# Patient Record
Sex: MALE | Race: WHITE | NOT HISPANIC OR LATINO | Employment: OTHER | URBAN - METROPOLITAN AREA
[De-identification: names, ages, dates, MRNs, and addresses within clinical notes are randomized per-mention and may not be internally consistent; named-entity substitution may affect disease eponyms.]

---

## 2019-04-13 ENCOUNTER — HOSPITAL ENCOUNTER (EMERGENCY)
Facility: HOSPITAL | Age: 59
Discharge: HOME | End: 2019-04-13
Attending: FAMILY MEDICINE
Payer: MEDICARE

## 2019-04-13 VITALS
HEART RATE: 97 BPM | WEIGHT: 250 LBS | BODY MASS INDEX: 32.08 KG/M2 | TEMPERATURE: 98 F | OXYGEN SATURATION: 91 % | HEIGHT: 74 IN | SYSTOLIC BLOOD PRESSURE: 133 MMHG | DIASTOLIC BLOOD PRESSURE: 88 MMHG | RESPIRATION RATE: 16 BRPM

## 2019-04-13 DIAGNOSIS — R51.9 ACUTE NONINTRACTABLE HEADACHE, UNSPECIFIED HEADACHE TYPE: Primary | ICD-10-CM

## 2019-04-13 PROCEDURE — 70450 CT HEAD/BRAIN W/O DYE: CPT | Mod: 26,,, | Performed by: RADIOLOGY

## 2019-04-13 PROCEDURE — 96372 THER/PROPH/DIAG INJ SC/IM: CPT

## 2019-04-13 PROCEDURE — 70450 CT HEAD WITHOUT CONTRAST: ICD-10-PCS | Mod: 26,,, | Performed by: RADIOLOGY

## 2019-04-13 PROCEDURE — 63600175 PHARM REV CODE 636 W HCPCS: Performed by: FAMILY MEDICINE

## 2019-04-13 PROCEDURE — 70450 CT HEAD/BRAIN W/O DYE: CPT | Mod: TC

## 2019-04-13 PROCEDURE — 99284 EMERGENCY DEPT VISIT MOD MDM: CPT | Mod: 25

## 2019-04-13 RX ORDER — DEXAMETHASONE SODIUM PHOSPHATE 100 MG/10ML
10 INJECTION INTRAMUSCULAR; INTRAVENOUS
Status: COMPLETED | OUTPATIENT
Start: 2019-04-13 | End: 2019-04-13

## 2019-04-13 RX ORDER — CARBAMAZEPINE 200 MG/1
200 TABLET ORAL 4 TIMES DAILY
COMMUNITY

## 2019-04-13 RX ORDER — KETOROLAC TROMETHAMINE 30 MG/ML
60 INJECTION, SOLUTION INTRAMUSCULAR; INTRAVENOUS
Status: COMPLETED | OUTPATIENT
Start: 2019-04-13 | End: 2019-04-13

## 2019-04-13 RX ORDER — DIPHENHYDRAMINE HYDROCHLORIDE 50 MG/ML
50 INJECTION INTRAMUSCULAR; INTRAVENOUS
Status: COMPLETED | OUTPATIENT
Start: 2019-04-13 | End: 2019-04-13

## 2019-04-13 RX ADMIN — DIPHENHYDRAMINE HYDROCHLORIDE 50 MG: 50 INJECTION, SOLUTION INTRAMUSCULAR; INTRAVENOUS at 03:04

## 2019-04-13 RX ADMIN — DEXAMETHASONE SODIUM PHOSPHATE 10 MG: 10 INJECTION INTRAMUSCULAR; INTRAVENOUS at 03:04

## 2019-04-13 RX ADMIN — KETOROLAC TROMETHAMINE 60 MG: 30 INJECTION, SOLUTION INTRAMUSCULAR at 03:04

## 2019-04-13 NOTE — ED PROVIDER NOTES
Encounter Date: 4/13/2019       History     Chief Complaint   Patient presents with    Headache     Patient presents the ED with approximately one-week history unrelenting headache, denies any nausea.  Has a history of migraine but states this is not his typical pain. Patient also has a seizure disorder, he is concerned because due to pain he has been unable to sleep for the past 48 hr but denies any seizure activity.        Review of patient's allergies indicates:  No Known Allergies  Past Medical History:   Diagnosis Date    GERD (gastroesophageal reflux disease)     Hypertension     Seizures      No past surgical history on file.  No family history on file.  Social History     Tobacco Use    Smoking status: Not on file   Substance Use Topics    Alcohol use: Not on file    Drug use: Not on file     Review of Systems   HENT: Negative.    Gastrointestinal: Negative.    Neurological: Positive for headaches. Negative for dizziness and seizures.   Psychiatric/Behavioral: Negative.        Physical Exam     Initial Vitals [04/13/19 0128]   BP Pulse Resp Temp SpO2   133/88 97 16 98.3 °F (36.8 °C) (!) 91 %      MAP       --         Physical Exam    Nursing note and vitals reviewed.  Constitutional: He appears well-developed and well-nourished. He is not diaphoretic. No distress.   HENT:   Head: Normocephalic and atraumatic.   Eyes: Pupils are equal, round, and reactive to light.   Neck: Normal range of motion. Neck supple.   Pulmonary/Chest: No respiratory distress.   Musculoskeletal: Normal range of motion. He exhibits no edema.   Neurological: He is alert and oriented to person, place, and time. He has normal strength. He displays normal reflexes. No cranial nerve deficit or sensory deficit. GCS score is 15. GCS eye subscore is 4. GCS verbal subscore is 5. GCS motor subscore is 6.   Skin: Skin is warm and dry. Capillary refill takes less than 2 seconds.   Psychiatric: He has a normal mood and affect. His behavior  is normal. Judgment and thought content normal.         ED Course   Procedures  Labs Reviewed - No data to display       Imaging Results          CT Head Without Contrast (Final result)  Result time 04/13/19 06:12:44    Final result by Haris Fuchs MD (04/13/19 06:12:44)                 Impression:      1. No acute intracranial abnormality.  2. Mild cortical atrophy with periventricular deep white matter change consistent with early small vessel ischemic disease.      Electronically signed by: Haris Fuchs  Date:    04/13/2019  Time:    06:12             Narrative:    EXAMINATION:  CT HEAD WITHOUT CONTRAST    CLINICAL HISTORY:  Headache, acute, norm neuro exam;    TECHNIQUE:  Low dose axial images were obtained through the head.  Coronal and sagittal reformations were also performed. Contrast was not administered.    COMPARISON:  None.    FINDINGS:  There is no acute hemorrhage or infarction.  There is mild cortical atrophy.  There are periventricular deep white matter changes consistent with early small vessel ischemic disease.    No extra-axial fluid collections.  Ventricles are normal in size, shape and configuration.  The basal cisterns are patent.    The imaged paranasal sinuses and ethmoid air cells are well aerated.    The mastoid air cells and middle ears are normally pneumatized.                                 Medical Decision Making:   ED Management:  Ct head per [facility]:   1. No acute intracranial abnormality.  2. Mild cortical atrophy with periventricular deep white matter change consistent with early small vessel ischemic disease.                       ED Course as of Apr 13 0743   Sat Apr 13, 2019   0416 CT head per Vrad:  FINDINGS:  Brain: Normal. No hemorrhage. No significant white matter disease. No edema.  Ventricles: Normal. No ventriculomegaly.  Bones/joints: Unremarkable. No acute fracture.  Sinuses: Visualized sinuses are unremarkable. No acute sinusitis.  Mastoid air cells: Visualized  mastoid air cells are unremarkable. No mastoid effusion.  Soft tissues: Unremarkable.  IMPRESSION:  No acute intracranial abnormality.    [MD]   0441 Pt sleeping, easily arousable. States headache has resolved.     [MD]      ED Course User Index  [MD] Eleni Dong MD     Clinical Impression:       ICD-10-CM ICD-9-CM   1. Acute nonintractable headache, unspecified headache type R51 784.0                                Eleni Dong MD  04/13/19 0746

## 2019-04-13 NOTE — DISCHARGE INSTRUCTIONS
Home to rest in a dark quiet room, sleep as much as possible.   If headache returns, take 50mg Benadryl, 600mg Motrin and go to bed. If this does not relieve headache or if other symptoms develop return to the ER for evaluation.  Follow up with primary care provider in 1-2 days.

## 2019-04-13 NOTE — ED TRIAGE NOTES
Pt reports headache progressively worsening for the past 3 days.  Pt reports having some oxycodone leftover from surgery last year but it didn't help, only caused constipation.